# Patient Record
Sex: FEMALE | Race: BLACK OR AFRICAN AMERICAN | NOT HISPANIC OR LATINO | Employment: OTHER | URBAN - METROPOLITAN AREA
[De-identification: names, ages, dates, MRNs, and addresses within clinical notes are randomized per-mention and may not be internally consistent; named-entity substitution may affect disease eponyms.]

---

## 2021-09-16 PROCEDURE — 76815 OB US LIMITED FETUS(S): CPT | Mod: 26 | Performed by: EMERGENCY MEDICINE

## 2021-09-16 PROCEDURE — 76815 OB US LIMITED FETUS(S): CPT

## 2021-09-16 PROCEDURE — 99284 EMERGENCY DEPT VISIT MOD MDM: CPT | Mod: 25 | Performed by: EMERGENCY MEDICINE

## 2021-09-16 PROCEDURE — 99284 EMERGENCY DEPT VISIT MOD MDM: CPT | Mod: 25

## 2021-09-16 ASSESSMENT — MIFFLIN-ST. JEOR: SCORE: 1555.1

## 2021-09-17 ENCOUNTER — HOSPITAL ENCOUNTER (EMERGENCY)
Facility: CLINIC | Age: 26
Discharge: HOME | End: 2021-09-17
Attending: EMERGENCY MEDICINE | Admitting: EMERGENCY MEDICINE
Payer: MEDICAID

## 2021-09-17 VITALS
HEIGHT: 65 IN | BODY MASS INDEX: 29.72 KG/M2 | WEIGHT: 178.4 LBS | SYSTOLIC BLOOD PRESSURE: 118 MMHG | HEART RATE: 68 BPM | TEMPERATURE: 98.2 F | DIASTOLIC BLOOD PRESSURE: 81 MMHG | RESPIRATION RATE: 16 BRPM | OXYGEN SATURATION: 99 %

## 2021-09-17 DIAGNOSIS — O26.892 PREGNANCY RELATED PELVIC PAIN, ANTEPARTUM, SECOND TRIMESTER: ICD-10-CM

## 2021-09-17 DIAGNOSIS — Z3A.17 17 WEEKS GESTATION OF PREGNANCY: ICD-10-CM

## 2021-09-17 DIAGNOSIS — N94.9 ROUND LIGAMENT PAIN: ICD-10-CM

## 2021-09-17 DIAGNOSIS — R10.2 PREGNANCY RELATED PELVIC PAIN, ANTEPARTUM, SECOND TRIMESTER: ICD-10-CM

## 2021-09-17 LAB
ALBUMIN SERPL-MCNC: 3.6 G/DL (ref 3.4–5)
ALBUMIN UR-MCNC: NEGATIVE MG/DL
ALP SERPL-CCNC: 60 U/L (ref 40–150)
ALT SERPL W P-5'-P-CCNC: 19 U/L (ref 0–50)
ANION GAP SERPL CALCULATED.3IONS-SCNC: 9 MMOL/L (ref 3–14)
APPEARANCE UR: CLEAR
AST SERPL W P-5'-P-CCNC: 15 U/L (ref 0–45)
BASOPHILS # BLD AUTO: 0 10E3/UL (ref 0–0.2)
BASOPHILS NFR BLD AUTO: 0 %
BILIRUB SERPL-MCNC: 0.7 MG/DL (ref 0.2–1.3)
BILIRUB UR QL STRIP: NEGATIVE
BUN SERPL-MCNC: 5 MG/DL (ref 7–30)
CALCIUM SERPL-MCNC: 9.2 MG/DL (ref 8.5–10.1)
CHLORIDE BLD-SCNC: 102 MMOL/L (ref 94–109)
CO2 SERPL-SCNC: 24 MMOL/L (ref 20–32)
COLOR UR AUTO: ABNORMAL
CREAT SERPL-MCNC: 0.71 MG/DL (ref 0.52–1.04)
EOSINOPHIL # BLD AUTO: 0.1 10E3/UL (ref 0–0.7)
EOSINOPHIL NFR BLD AUTO: 1 %
ERYTHROCYTE [DISTWIDTH] IN BLOOD BY AUTOMATED COUNT: 13.2 % (ref 10–15)
GFR SERPL CREATININE-BSD FRML MDRD: >90 ML/MIN/1.73M2
GLUCOSE BLD-MCNC: 93 MG/DL (ref 70–99)
GLUCOSE UR STRIP-MCNC: NEGATIVE MG/DL
HCG UR QL: POSITIVE
HCT VFR BLD AUTO: 39.2 % (ref 35–47)
HGB BLD-MCNC: 13.8 G/DL (ref 11.7–15.7)
HGB UR QL STRIP: NEGATIVE
HYALINE CASTS: 1 /LPF
IMM GRANULOCYTES # BLD: 0 10E3/UL
IMM GRANULOCYTES NFR BLD: 0 %
KETONES UR STRIP-MCNC: ABNORMAL MG/DL
LEUKOCYTE ESTERASE UR QL STRIP: NEGATIVE
LIPASE SERPL-CCNC: 175 U/L (ref 73–393)
LYMPHOCYTES # BLD AUTO: 2 10E3/UL (ref 0.8–5.3)
LYMPHOCYTES NFR BLD AUTO: 31 %
MCH RBC QN AUTO: 28.3 PG (ref 26.5–33)
MCHC RBC AUTO-ENTMCNC: 35.2 G/DL (ref 31.5–36.5)
MCV RBC AUTO: 81 FL (ref 78–100)
MONOCYTES # BLD AUTO: 0.4 10E3/UL (ref 0–1.3)
MONOCYTES NFR BLD AUTO: 6 %
MUCOUS THREADS #/AREA URNS LPF: PRESENT /LPF
NEUTROPHILS # BLD AUTO: 3.8 10E3/UL (ref 1.6–8.3)
NEUTROPHILS NFR BLD AUTO: 62 %
NITRATE UR QL: NEGATIVE
NRBC # BLD AUTO: 0 10E3/UL
NRBC BLD AUTO-RTO: 0 /100
PH UR STRIP: 5.5 [PH] (ref 5–7)
PLATELET # BLD AUTO: 191 10E3/UL (ref 150–450)
POTASSIUM BLD-SCNC: 3.5 MMOL/L (ref 3.4–5.3)
PROT SERPL-MCNC: 8.4 G/DL (ref 6.8–8.8)
RBC # BLD AUTO: 4.87 10E6/UL (ref 3.8–5.2)
RBC URINE: 0 /HPF
SODIUM SERPL-SCNC: 135 MMOL/L (ref 133–144)
SP GR UR STRIP: 1.01 (ref 1–1.03)
SQUAMOUS EPITHELIAL: <1 /HPF
UROBILINOGEN UR STRIP-MCNC: NORMAL MG/DL
WBC # BLD AUTO: 6.2 10E3/UL (ref 4–11)
WBC URINE: 1 /HPF

## 2021-09-17 PROCEDURE — 81001 URINALYSIS AUTO W/SCOPE: CPT | Performed by: EMERGENCY MEDICINE

## 2021-09-17 PROCEDURE — 36415 COLL VENOUS BLD VENIPUNCTURE: CPT | Performed by: EMERGENCY MEDICINE

## 2021-09-17 PROCEDURE — 83690 ASSAY OF LIPASE: CPT | Performed by: EMERGENCY MEDICINE

## 2021-09-17 PROCEDURE — 80053 COMPREHEN METABOLIC PANEL: CPT | Performed by: EMERGENCY MEDICINE

## 2021-09-17 PROCEDURE — 250N000013 HC RX MED GY IP 250 OP 250 PS 637: Performed by: EMERGENCY MEDICINE

## 2021-09-17 PROCEDURE — 81025 URINE PREGNANCY TEST: CPT | Performed by: EMERGENCY MEDICINE

## 2021-09-17 PROCEDURE — 85025 COMPLETE CBC W/AUTO DIFF WBC: CPT | Performed by: EMERGENCY MEDICINE

## 2021-09-17 PROCEDURE — 250N000011 HC RX IP 250 OP 636: Performed by: EMERGENCY MEDICINE

## 2021-09-17 RX ORDER — ONDANSETRON 4 MG/1
4 TABLET, ORALLY DISINTEGRATING ORAL ONCE
Status: COMPLETED | OUTPATIENT
Start: 2021-09-17 | End: 2021-09-17

## 2021-09-17 RX ORDER — ACETAMINOPHEN 500 MG
1000 TABLET ORAL ONCE
Status: COMPLETED | OUTPATIENT
Start: 2021-09-17 | End: 2021-09-17

## 2021-09-17 RX ORDER — ACETAMINOPHEN 325 MG/1
325-650 TABLET ORAL EVERY 6 HOURS PRN
Qty: 30 TABLET | Refills: 0 | Status: SHIPPED | OUTPATIENT
Start: 2021-09-17 | End: 2022-01-25

## 2021-09-17 RX ADMIN — ONDANSETRON 4 MG: 4 TABLET, ORALLY DISINTEGRATING ORAL at 01:34

## 2021-09-17 RX ADMIN — ACETAMINOPHEN 1000 MG: 500 TABLET, FILM COATED ORAL at 01:34

## 2021-09-17 NOTE — ED PROVIDER NOTES
"    [facility] EMERGENCY DEPARTMENT ([facility])   September 17, 2021  History     Chief Complaint   Patient presents with     Abdominal Pain     17 weeks 5 days pregnant.       HPI  Esperanza Wilder is a 26 year old female who presents to the Emergency Department for evaluation of lower abdominal pain starting 5 days ago.  Patient reports she is 17 weeks and 6 days pregnant.  Patient reports this is her 1st pregnancy and she has not had issues so far.  She reports she has bilateral lower abdominal pain that radiates into the pelvis.  She states she has had some vomiting.  She denies vaginal bleeding, dysuria, hematuria, fever, chest pain, shortness of breath, or abnormal bowel movements.  She denies allergies or past surgeries.  She reports she has not been seen for this pregnancy.  Denies any other medical issues.    PAST MEDICAL HISTORY: No past medical history on file.    PAST SURGICAL HISTORY: No past surgical history on file.    Past medical history, past surgical history, medications, and allergies were reviewed with the patient. Additional pertinent items: None    FAMILY HISTORY: No family history on file.    SOCIAL HISTORY:   Social History     Tobacco Use     Smoking status: Not on file   Substance Use Topics     Alcohol use: Not on file     Social history was reviewed with the patient. Additional pertinent items: None      Patient's Medications    No medications on file        No Known Allergies     Review of Systems  A complete review of systems was performed with pertinent positives and negatives noted in the HPI, and all other systems negative.    Physical Exam   BP: 113/65  Pulse: 81  Temp: 97.8  F (36.6  C)  Resp: 18  Height: 165.1 cm (5' 5\")  Weight: 80.9 kg (178 lb 6.4 oz)  SpO2: 100 %  Lying Orthostatic BP: 113/65      Physical Exam  Physical Exam   Constitutional: oriented to person, place, and time. appears well-developed and well-nourished.   HENT:   Head: Normocephalic and atraumatic. "   Neck: Normal range of motion.   Pulmonary/Chest: Effort normal. No respiratory distress.   Cardiac: No murmurs, rubs, gallops. RRR.  Abdominal: Abdomen soft, nontender, nondistended. No rebound tenderness. No CVA tenderness.  MSK: Long bones without deformity or evidence of trauma  Neurological: alert and oriented to person, place, and time.   Skin: Skin is warm and dry.   Psychiatric:  normal mood and affect.  behavior is normal. Thought content normal.     ED Course   1:04 AM  The patient was seen and examined by Jakob Jackson MD in Room ED08.      Procedures     Results for orders placed or performed during the hospital encounter of 09/17/21   POC US OB TRANSABDOMINAL LIMITED     Status: None    Impression    Limited Bedside Transabdominal ultrasound for evaluation of IUP        Performed any interpreted by me.    Indication:  abdominal pain  Findings:  The lower abdomen was interrogated with a curvilinear probe. The uterus was identified.   Within the uterus there is a moving fetus with visible heart rate with FHR of 140's    Impression: Intrauterine pregnancy     UA with Microscopic reflex to Culture     Status: Abnormal    Specimen: Urine, Midstream   Result Value Ref Range    Color Urine Light Yellow Colorless, Straw, Light Yellow, Yellow    Appearance Urine Clear Clear    Glucose Urine Negative Negative mg/dL    Bilirubin Urine Negative Negative    Ketones Urine Trace (A) Negative mg/dL    Specific Gravity Urine 1.010 1.003 - 1.035    Blood Urine Negative Negative    pH Urine 5.5 5.0 - 7.0    Protein Albumin Urine Negative Negative mg/dL    Urobilinogen Urine Normal Normal, 2.0 mg/dL    Nitrite Urine Negative Negative    Leukocyte Esterase Urine Negative Negative    Mucus Urine Present (A) None Seen /LPF    RBC Urine 0 <=2 /HPF    WBC Urine 1 <=5 /HPF    Squamous Epithelials Urine <1 <=1 /HPF    Hyaline Casts Urine 1 <=2 /LPF    Narrative    Urine Culture not indicated   HCG qualitative urine (UPT)      Status: Abnormal   Result Value Ref Range    hCG Urine Qualitative Positive (A) Negative   Comprehensive metabolic panel     Status: Abnormal   Result Value Ref Range    Sodium 135 133 - 144 mmol/L    Potassium 3.5 3.4 - 5.3 mmol/L    Chloride 102 94 - 109 mmol/L    Carbon Dioxide (CO2) 24 20 - 32 mmol/L    Anion Gap 9 3 - 14 mmol/L    Urea Nitrogen 5 (L) 7 - 30 mg/dL    Creatinine 0.71 0.52 - 1.04 mg/dL    Calcium 9.2 8.5 - 10.1 mg/dL    Glucose 93 70 - 99 mg/dL    Alkaline Phosphatase 60 40 - 150 U/L    AST 15 0 - 45 U/L    ALT 19 0 - 50 U/L    Protein Total 8.4 6.8 - 8.8 g/dL    Albumin 3.6 3.4 - 5.0 g/dL    Bilirubin Total 0.7 0.2 - 1.3 mg/dL    GFR Estimate >90 >60 mL/min/1.73m2   Lipase     Status: Normal   Result Value Ref Range    Lipase 175 73 - 393 U/L   CBC with platelets and differential     Status: None   Result Value Ref Range    WBC Count 6.2 4.0 - 11.0 10e3/uL    RBC Count 4.87 3.80 - 5.20 10e6/uL    Hemoglobin 13.8 11.7 - 15.7 g/dL    Hematocrit 39.2 35.0 - 47.0 %    MCV 81 78 - 100 fL    MCH 28.3 26.5 - 33.0 pg    MCHC 35.2 31.5 - 36.5 g/dL    RDW 13.2 10.0 - 15.0 %    Platelet Count 191 150 - 450 10e3/uL    % Neutrophils 62 %    % Lymphocytes 31 %    % Monocytes 6 %    % Eosinophils 1 %    % Basophils 0 %    % Immature Granulocytes 0 %    NRBCs per 100 WBC 0 <1 /100    Absolute Neutrophils 3.8 1.6 - 8.3 10e3/uL    Absolute Lymphocytes 2.0 0.8 - 5.3 10e3/uL    Absolute Monocytes 0.4 0.0 - 1.3 10e3/uL    Absolute Eosinophils 0.1 0.0 - 0.7 10e3/uL    Absolute Basophils 0.0 0.0 - 0.2 10e3/uL    Absolute Immature Granulocytes 0.0 <=0.0 10e3/uL    Absolute NRBCs 0.0 10e3/uL   CBC with platelets differential     Status: None    Narrative    The following orders were created for panel order CBC with platelets differential.  Procedure                               Abnormality         Status                     ---------                               -----------         ------                     CBC with  platelets and d...[[id number]]                      Final result                 Please view results for these tests on the individual orders.       No results found for this or any previous visit (from the past 24 hour(s)).  Medications - No data to display          Assessments & Plan (with Medical Decision Making)   MDM  Patient is presenting with abdominal pain. This is on both sides of her abdomen in the inguinal area. There are no masses hernia. She is having bowel movements, very unlikely obstruction. She has no focal right lower left lower quadrant tenderness, white blood count normal, very unlikely appendicitis or diverticulitis. Not feel CT is necessary at this point. No upper abdominal pain, very unlikely gallbladder related etiology. Labs are reassuring. Urinalysis does not reveal infection. She is not having bleeding, baby appears grossly healthy on ultrasound with a good heart rate and movement. This does seem to be round ligament pain in the setting of second trimester pregnancy and typical symptoms around ligament pain. She will be given a OB follow-up and Tylenol to go home with. She does feel better after Tylenol here.    I have reviewed the nursing notes.    I have reviewed the findings, diagnosis, plan and need for follow up with the patient.    New Prescriptions    ACETAMINOPHEN (TYLENOL) 325 MG TABLET    Take 1-2 tablets (325-650 mg) by mouth every 6 hours as needed for mild pain       Final diagnoses:   Round ligament pain     IRasheed, am serving as a trained medical scribe to document services personally performed by Jakob Jackson MD, based on the provider's statements to me.     IJakob MD, was physically present and have reviewed and verified the accuracy of this note documented by Rasheed Henderson.    --  Jakob Jackson MD  9/16/2021   [facility] EMERGENCY DEPARTMENT     Al Jackson MD  09/17/21 0245

## 2021-09-17 NOTE — DISCHARGE INSTRUCTIONS
Please make an appointment to follow up with OB/Gyn - [facility] (phone: [phone]) as soon as possible.    Return to the emergency department if you develop worsening symptoms, bleeding, fevers or if you have any further concerns    If Esperanza has discomfort from fever or other pain, she can have:  Acetaminophen (Tylenol) every 6 hours as needed. Her dose is:    2 regular strength tabs (650 mg)                                     (43.2+ kg/96+ lb)    NOTE: If your acetaminophen (Tylenol) came with a dropper marked with 0.4 and 0.8 ml, call us ([phone]) or check with your doctor about the dose before using it.

## 2021-09-21 ENCOUNTER — TELEPHONE (OUTPATIENT)
Dept: FAMILY MEDICINE | Facility: CLINIC | Age: 26
End: 2021-09-21

## 2021-09-21 DIAGNOSIS — Z32.01 PREGNANCY TEST POSITIVE: Primary | ICD-10-CM

## 2021-09-21 NOTE — TELEPHONE ENCOUNTER
"Please call patient's  to establish OB care for patient. Reports referred to [facility] after ER visit and patient found out she is \"almost 18 weeks pregnant\".  Montez Healy [phone]  "

## 2021-09-21 NOTE — TELEPHONE ENCOUNTER
RN called pt's  and scheduled. Soonest wasn't until 9/30.offered to refer to other clinic to get in sooner but wanted to come here    Ruthie Tesfaye RN

## 2021-09-30 ENCOUNTER — OFFICE VISIT (OUTPATIENT)
Dept: FAMILY MEDICINE | Facility: CLINIC | Age: 26
End: 2021-09-30
Payer: MEDICAID

## 2021-09-30 VITALS
HEIGHT: 66 IN | OXYGEN SATURATION: 98 % | WEIGHT: 180 LBS | SYSTOLIC BLOOD PRESSURE: 97 MMHG | BODY MASS INDEX: 28.93 KG/M2 | TEMPERATURE: 97.5 F | HEART RATE: 74 BPM | DIASTOLIC BLOOD PRESSURE: 64 MMHG

## 2021-09-30 DIAGNOSIS — Z23 HIGH PRIORITY FOR 2019-NCOV VACCINE: ICD-10-CM

## 2021-09-30 DIAGNOSIS — Z3A.19 19 WEEKS GESTATION OF PREGNANCY: Primary | ICD-10-CM

## 2021-09-30 DIAGNOSIS — Z23 NEED FOR PROPHYLACTIC VACCINATION AND INOCULATION AGAINST INFLUENZA: ICD-10-CM

## 2021-09-30 DIAGNOSIS — Z32.01 PREGNANCY TEST POSITIVE: ICD-10-CM

## 2021-09-30 PROCEDURE — 90471 IMMUNIZATION ADMIN: CPT | Performed by: FAMILY MEDICINE

## 2021-09-30 PROCEDURE — 99207 PR FIRST OB VISIT: CPT | Mod: 25 | Performed by: FAMILY MEDICINE

## 2021-09-30 PROCEDURE — 0001A COVID-19,PF,PFIZER (12+ YRS): CPT | Performed by: FAMILY MEDICINE

## 2021-09-30 PROCEDURE — 99207 PR NO BILLABLE SERVICE THIS VISIT: CPT

## 2021-09-30 PROCEDURE — 90686 IIV4 VACC NO PRSV 0.5 ML IM: CPT | Performed by: FAMILY MEDICINE

## 2021-09-30 PROCEDURE — 91300 COVID-19,PF,PFIZER (12+ YRS): CPT | Performed by: FAMILY MEDICINE

## 2021-09-30 RX ORDER — PNV NO.95/FERROUS FUM/FOLIC AC 28MG-0.8MG
1 TABLET ORAL DAILY
Qty: 90 TABLET | Refills: 1 | Status: SHIPPED | OUTPATIENT
Start: 2021-09-30

## 2021-09-30 ASSESSMENT — MIFFLIN-ST. JEOR: SCORE: 1574.22

## 2021-09-30 NOTE — PROGRESS NOTES
Past Medical History     Do you have a history of any of the following medical conditions?    Condition No/Yes/Details   Hypertension No    Heart disease, mitral valve prolapse, rheumatic fever No    Asthma or another chronic lung disease No    An autoimmune disorder No    Kidney disease No    Frequent urinary tract infections No    Epilepsy, seizures, or spells No    Migraine headaches No    Stroke, loss of sensation/function, seizures, or other neuro problem No    Diabetes No    Thyroid problems or have you taken thyroid medication No    Hepatitis, liver disease, jaundice No    Blood clots, phlebitis, pulmonary embolism or varicose veins No    Excessive bleeding after surgery or dental work No    Do you have more bleeding than other women after a cut or a scratch? No    Anemia No    Blood transfusions No         Would you refuse a blood transfusion?       No   If yes, then ask next question. If no, skip next question.   Would you rather die than receive a blood transfusion?    Breast problems No    Have you ever ? No    Abnormalities of the uterus No    Abnormal pap smear No    Have you ever been treated for depression? No    Are you having problems with crying spells or loss of self-esteem? No    Have you ever required psychiatric care? No    Have you been physically, sexually, or emotionally hurt by someone? No    Have you been in a major accident or suffered serious trauma? No    Have you ever had any gynecological surgical procedures such as cervical conization, LEEP, laser treatment, cryosurgery of the cervix, or a dilatation and curettage? No    Have you had any other surgical procedures? No         Have you ever had any complications from anesthesia? No    Have you ever been hospitalized for a nonsurgical reason? No                Genetic Screening          Is the patient 35 years or older? No      Do you have a history of any of the following No/Yes/Details        A metabolic  disorder (e.g. Insulin-dependent DM, PKU) No         Recurrent pregnancy loss or still birth No      Do you, the baby's father, or anyone in your families have          Thalassemia AND MCV <80 No         Hemophilia No         Neural tube defect No }        Congenital heart defect No         Sickle cell disease or trait No         Muscular dystrophy No         Cystic fibrosis No         Mental retardation or autism No         Down's syndrome No         Hector-Sach's disease No         Haysi's chorea No         Any other inherited genetic or chromosomal disorder No         A child with birth defects not listed above No                Infection History     Ever treated for tuberculosis or had a positive skin test No    Ever had genital herpes (or has your partner) No    Had a rash or viral illness since LMP No    Ever had a sexually transmitted infection No    Ever had chicken pox or the vaccine No    Have you had a sexual partner who is HIV positive No    Ever had any other serious infectious disease No               Substance use and exposure     Does anyone in your home smoke? No    Do you use tobacco products or betel nut? No    Do you drink beer, wine, or hard liquor? No    Do you use any of the following: marijuana, speed, cocaine, heroin, hallucinogens, or other drugs? No               Symptoms since Last Menstrual Period     Do you currently have any of the following symptoms: No/Yes/Details        Abdominal pain No         Blood in the stools or urine No         Chest pain No         Shortness of breath No         coughing or vomiting up blood No         Your heart racing or skipping beats No         Nausea or vomiting  YES occasional         Pain on urination No         Vaginal discharge or bleeding No                Risk Assessment     Average Risk Category  No significant risk factors: No    At Risk Category (up to 3)  Teen pregnancy: No  Poor social situation: No  Domestic abuse: No  Financial  difficulties: No  Smoker: No  H/O  deliver: No  H/O drug abuse: No  Non-English speaking: Yes  Advanced maternal age: No  H/O PIH: No  GDM risks: No  Previous C/S: No  H/O STIs: No  H/O mental health concerns: No  Onset care > 20 weeks: No  Other:     High Risk Category (4 or more At Risk or)  Diabetes/GDM: No  Multiple gestation: No  Chronic hypertension: No  Significant hx of asthma: No  Fetal demise > 20 weeks: No  Positive tox screen: No  Current mental health treatment: No  Other:     Risk: Average Risk   Date determined: 21                  Does Patient meet criteria for early diabetes screening or aspirin prophylaxis?  No

## 2021-09-30 NOTE — PROGRESS NOTES
"Return OB visit  12-23 weeks    Subjective:   Esperanza is a 26 year old  female at 19w5d who presents for prenatal care. GAIL 2022.  - Concerns today: none  - Patient reports no vaginal bleeding, no contractions/severe cramping, no leakage of fluid. Fetal movement is present.   - Occasional nausea/no vomiting. No heartburn.   - No vaginal discharge. No dysuria.   - No headache, vision changes, lower extremity swelling, upper abdominal pain, chest pain, shortness of breath.   - Mood has been okay.    Objective:   BP 97/64 (BP Location: Left arm, Patient Position: Sitting, Cuff Size: Adult Regular)   Pulse 74   Temp 97.5  F (36.4  C) (Oral)   Ht 1.67 m (5' 5.75\")   Wt 81.6 kg (180 lb)   LMP 05/15/2021   SpO2 98%   BMI 29.28 kg/m     Const: No distress  Abd: Gravid.   See flowsheet for FH, FHTs, edema     Prenatal labs:   -   Hemoglobin   Date Value Ref Range Status   2021 13.8 11.7 - 15.7 g/dL Final       Assessment & plan:   IUP at 84srw3i weeks by LMP, has not had first trimester US and late to prenatal care.     - Prenatal labs ordered today  - Pregnancy uncomplicated so far.  - Discussed and ordered ultrasound for fetal survey.   - Discussed screening for gestational diabetes at 24-28 weeks.  - Provided counseling regarding  labor symptoms, depression and social support systems, breast feeding, contraception options after delivery. The patient plans to deliver at [facility] with myself and/or OB partner Dr Garcia.     Breastfeeding education provided:  - Cue Feeding  - Supply and Demand  - Exclusivity   - Good Latch  - Avoiding Artifical Nipples    - Patient will continue taking prenatal vitamins and avoiding cigarettes & alcohol.   - Return to clinic in 4 weeks.    - Specific concerns addressed today: entry to prenatal care, initial labs and fetal survey ordered. Pt sure of LMP and was not on contraception.    1. 19 weeks gestation of pregnancy  - URINE MACROSCOPIC WITH " REFLEX TO MICRO; Future  - Urine Culture Aerobic Bacterial; Future  - Treponema Abs w Reflex to RPR and Titer; Future  - HIV Antigen Antibody Combo; Future  - HEPATITIS B SURFACE ANTIGEN; Future  - CBC WITH PLATELETS; Future  - Rubella Antibody IgG; Future  - ABO/Rh type and screen; Future  - Neisseria gonorrhoeae PCR; Future  - Chlamydia trachomatis PCR; Future  - Hemoglobin A1c; Future    2. Pregnancy test positive    Options for treatment and follow-up care were reviewed with the patient and/or guardian. Esperanza Wilder and/or guardian engaged in the decision making process and verbalized understanding of the options discussed and agreed with the final plan.    Arianne Morales, DO

## 2021-09-30 NOTE — PATIENT INSTRUCTIONS
Thank you for coming to [facility]!  - If you had lab testing today and your results are normal they will be be mailed to you or sent to your MyChart within seven days.   - If the lab tests need quick action we will call you with the results.  - The phone number we will call with results is # [phone] (home) . If this is not the best number please call our clinic and change the number.  - If any referrals were made to [facility] and you don't get a call, call central scheduling [phone]  - If you need any refills please call your pharmacy and they will contact us.  - If you had an XRay/CT/Ultrasound/MRI ordered the number is [phone] to schedule or change your appointment  - If you have any concerns about today's visit or wish to schedule another appointment please call our office [phone] (8-5:00 M-F)  - If you have urgent medical concerns call [phone] at any time of the day.  - If you have a medical emergency please call [phone].    Because you are pregnant, we have additional resources for you:  - You may call and ask to speak with one of our clinic nurses at [phone] during normal business hours, for non-urgent questions about your pregnancy.  - Immediate OB help is also available 24 hours a day, seven days a week via the [facility] ([facility]) - [phone]  located at [address] [place] [address]    Prenatal Timeline:  Before 14 weeks: dating ultrasound       This ultrasound helps us determine your dates accurately.  15 - 18 weeks: Optional genetic testing (quad screen)       This testing helps understand your baby's risk for some genetic abnormalities.  22 - 24 weeks: Ultrasound (fetal anatomy survey)       This testing will look for early growth abnormalities, and might tell the baby's sex.  24 - 28 weeks: One hour sugar test (GCT)        This test helps identify diabetes of pregnancy.  27 - 36 weeks: Tetanus  shot (Tdap)       This shot helps protect you and your baby from tetanus and whooping cough.  36 weeks and later: Group B Strep test (GBS)       This test helps predict if you need antibiotics in labor to prevent infection in your baby.  Anytime September to April: flu shot       This shot helps protect you and your family from the flu.  This is especially important during pregnancy!  Once every trimester: blood iron test (hemoglobin)       This test helps us know if you will need extra iron to support your baby.  At any time during or after your pregnancy: Some women experience baby blues.  We can help you with: Feeling anxious, Overwhelmed or sad, Trouble sleeping, Crying uncontrollably, Trouble caring for yourself or baby.    How frequently should you see your provider?  < 28 weeks: every 4 weeks  28-36 weeks: every 2 weeks  >36 weeks: every week    Call your healthcare provider immediately if you experience any of the following symptoms:  Bleeding from nipples, rectum, bladder, or coughing up blood  Vaginal bleeding, no matter how slight (unless small amount after a pelvic exam)  Swelling of hands or face  Dimness or blurring of vision  Severe or continuous headaches  Abdominal pains or contractions that do not go away with heat and rest or a bowel movement  Chills or fever over 100.4  Persistent vomiting  Painful or burning urination  Decrease in fetal movement  Sudden or slow escape of fluid from the vagina

## 2021-10-01 NOTE — PROGRESS NOTES
Family Medicine OB Education    I provided the following OB education to Esperanza Wilder.    Discussed that Dr Morales & Dr. Garcia should be the doctor that she sees for her prenatal visits and that they will try hard to be the one to deliver her baby.  Discussed that if they were unavailable that one of our other physicians would deliver the baby and that this could be a male or female provider.  Briefly discussed residency program and that multiple doctors would be present at time of delivery.  Sheet given and discussed fetal growth and development.  Sheet given and discussed warning signs with reasons to call clinic, L&D. Sheet given and discussed Tdap to be given after 27 weeks gestation and the importance of family members get immunized before delivery.See questionnaire and pregnancy risk assessment for further information in provider encounter.     Name of provider who requested the OB education: Andrew  Name of provider on site (faculty or community preceptor) at the time of performing the OB education: Andrew Tesfaye, RN, RN

## 2021-10-06 ENCOUNTER — ANCILLARY PROCEDURE (OUTPATIENT)
Dept: ULTRASOUND IMAGING | Facility: CLINIC | Age: 26
End: 2021-10-06
Attending: FAMILY MEDICINE
Payer: MEDICAID

## 2021-10-06 DIAGNOSIS — Z32.01 PREGNANCY TEST POSITIVE: ICD-10-CM

## 2021-10-06 PROCEDURE — 76805 OB US >/= 14 WKS SNGL FETUS: CPT | Performed by: RADIOLOGY

## 2021-10-18 ENCOUNTER — OFFICE VISIT (OUTPATIENT)
Dept: FAMILY MEDICINE | Facility: CLINIC | Age: 26
End: 2021-10-18
Payer: MEDICAID

## 2021-10-18 VITALS
HEART RATE: 74 BPM | RESPIRATION RATE: 16 BRPM | SYSTOLIC BLOOD PRESSURE: 96 MMHG | DIASTOLIC BLOOD PRESSURE: 61 MMHG | TEMPERATURE: 98.4 F | OXYGEN SATURATION: 100 % | BODY MASS INDEX: 29.15 KG/M2 | WEIGHT: 179.2 LBS

## 2021-10-18 DIAGNOSIS — G44.209 TENSION HEADACHE: Primary | ICD-10-CM

## 2021-10-18 DIAGNOSIS — Z3A.19 19 WEEKS GESTATION OF PREGNANCY: ICD-10-CM

## 2021-10-18 LAB
ABO/RH(D): NORMAL
ALBUMIN UR-MCNC: NEGATIVE MG/DL
ANTIBODY SCREEN: NEGATIVE
APPEARANCE UR: CLEAR
BILIRUB UR QL STRIP: NEGATIVE
COLOR UR AUTO: YELLOW
ERYTHROCYTE [DISTWIDTH] IN BLOOD BY AUTOMATED COUNT: 13 % (ref 10–15)
GLUCOSE UR STRIP-MCNC: NEGATIVE MG/DL
HBA1C MFR BLD: 4.8 % (ref 0–5.6)
HBV SURFACE AG SERPL QL IA: NONREACTIVE
HCT VFR BLD AUTO: 33.1 %
HGB BLD-MCNC: 11.5 G/DL (ref 11.7–15.7)
HGB UR QL STRIP: NEGATIVE
HIV 1+2 AB+HIV1 P24 AG SERPL QL IA: NONREACTIVE
KETONES UR STRIP-MCNC: ABNORMAL MG/DL
LEUKOCYTE ESTERASE UR QL STRIP: NEGATIVE
MCH RBC QN AUTO: 28.3 PG (ref 26.5–33)
MCHC RBC AUTO-ENTMCNC: 34.7 G/DL (ref 31.5–36.5)
MCV RBC AUTO: 82 FL (ref 78–100)
NITRATE UR QL: NEGATIVE
PH UR STRIP: 5.5 [PH] (ref 5–8)
PLATELET # BLD AUTO: 171 10E3/UL (ref 150–450)
RBC # BLD AUTO: 4.06 10E6/UL (ref 3.8–5.2)
SP GR UR STRIP: >=1.03 (ref 1–1.03)
SPECIMEN EXPIRATION DATE: NORMAL
T PALLIDUM AB SER QL: NONREACTIVE
UROBILINOGEN UR STRIP-ACNC: 0.2 E.U./DL
WBC # BLD AUTO: 7.2 10E3/UL (ref 4–11)

## 2021-10-18 PROCEDURE — 86900 BLOOD TYPING SEROLOGIC ABO: CPT | Performed by: FAMILY MEDICINE

## 2021-10-18 PROCEDURE — 87086 URINE CULTURE/COLONY COUNT: CPT | Performed by: FAMILY MEDICINE

## 2021-10-18 PROCEDURE — 85027 COMPLETE CBC AUTOMATED: CPT | Performed by: FAMILY MEDICINE

## 2021-10-18 PROCEDURE — 86850 RBC ANTIBODY SCREEN: CPT | Performed by: FAMILY MEDICINE

## 2021-10-18 PROCEDURE — 87340 HEPATITIS B SURFACE AG IA: CPT | Performed by: FAMILY MEDICINE

## 2021-10-18 PROCEDURE — 83036 HEMOGLOBIN GLYCOSYLATED A1C: CPT | Performed by: FAMILY MEDICINE

## 2021-10-18 PROCEDURE — 87491 CHLMYD TRACH DNA AMP PROBE: CPT | Performed by: FAMILY MEDICINE

## 2021-10-18 PROCEDURE — 86901 BLOOD TYPING SEROLOGIC RH(D): CPT | Performed by: FAMILY MEDICINE

## 2021-10-18 PROCEDURE — 87591 N.GONORRHOEAE DNA AMP PROB: CPT | Performed by: FAMILY MEDICINE

## 2021-10-18 PROCEDURE — 86780 TREPONEMA PALLIDUM: CPT | Performed by: FAMILY MEDICINE

## 2021-10-18 PROCEDURE — 86762 RUBELLA ANTIBODY: CPT | Performed by: FAMILY MEDICINE

## 2021-10-18 PROCEDURE — 81003 URINALYSIS AUTO W/O SCOPE: CPT | Performed by: FAMILY MEDICINE

## 2021-10-18 PROCEDURE — 99207 PR PRENATAL VISIT: CPT | Performed by: FAMILY MEDICINE

## 2021-10-18 PROCEDURE — 36415 COLL VENOUS BLD VENIPUNCTURE: CPT | Performed by: FAMILY MEDICINE

## 2021-10-18 PROCEDURE — 87389 HIV-1 AG W/HIV-1&-2 AB AG IA: CPT | Performed by: FAMILY MEDICINE

## 2021-10-18 RX ORDER — ACETAMINOPHEN 500 MG
500-1000 TABLET ORAL EVERY 6 HOURS PRN
Qty: 100 TABLET | Refills: 0 | Status: SHIPPED | OUTPATIENT
Start: 2021-10-18 | End: 2022-01-25

## 2021-10-18 NOTE — PROGRESS NOTES
OB Visit 12-23 weeks  Subjective    26 year old woman presents at 22w2d.   Concerns: none    Headache YES- tention type, no visual change/edema/elevated BP - has not tried tylenol.   Changes in vision no  Chest pain  no  Dyspnea no  Nausea no  no  vomiting. Abdominal pain no . Dysuria no  Vaginal Discharge no  Vaginal bleeding no .  Loss of Fluid no    Extremity swelling no .   Fetal movement Yes.    There is no problem list on file for this patient.      Current Outpatient Medications   Medication Sig Dispense Refill     Prenatal Vit-Fe Fumarate-FA (PRENATAL VITAMIN) 27-0.8 MG TABS Take 1 tablet by mouth daily 90 tablet 1     acetaminophen (TYLENOL) 325 MG tablet Take 1-2 tablets (325-650 mg) by mouth every 6 hours as needed for mild pain (Patient not taking: Reported on 2021) 30 tablet 0       Guidance:  OTC medications  weight gain and exercise  quickening  child birth education  fetal survey ultrasound    Objective  BP 96/61   Pulse 74   Temp 98.4  F (36.9  C) (Oral)   Resp 16   Wt 81.3 kg (179 lb 3.2 oz)   LMP 05/15/2021   SpO2 100%   BMI 29.15 kg/m    No distress.  Gravid abdomen.  .  Fundal height 21 cm.  No edema.    Results  Blood type: pending  No results found for any visits on 10/18/21.    Assessment & Plan  26 year old  at 22w2d with GAIL 2022 based on LMP.    (G44.209) Tension headache  (primary encounter diagnosis)  Comment: no red flag signs for pre-E, try tyelnol, massage, heating pad  Plan: acetaminophen (TYLENOL) 500 MG tablet    (Z3A.19) 19 weeks gestation of pregnancy  Comment: has not had prenatal labs done, late to care  Plan: prenatal labs today, schedule fetal survey    Return to clinic in 4 weeks.    JACQUELINE LAY

## 2021-10-19 LAB
BACTERIA UR CULT: NO GROWTH
C TRACH DNA SPEC QL NAA+PROBE: NEGATIVE
N GONORRHOEA DNA SPEC QL NAA+PROBE: NEGATIVE
RUBV IGG SERPL QL IA: 11.5 INDEX
RUBV IGG SERPL QL IA: POSITIVE

## 2021-10-21 ENCOUNTER — IMMUNIZATION (OUTPATIENT)
Dept: FAMILY MEDICINE | Facility: CLINIC | Age: 26
End: 2021-10-21
Attending: FAMILY MEDICINE
Payer: MEDICAID

## 2021-10-21 DIAGNOSIS — Z23 HIGH PRIORITY FOR 2019-NCOV VACCINE: Primary | ICD-10-CM

## 2021-10-21 PROCEDURE — 91300 COVID-19,PF,PFIZER (12+ YRS): CPT

## 2021-10-21 PROCEDURE — 0002A COVID-19,PF,PFIZER (12+ YRS): CPT

## 2021-10-21 NOTE — PROGRESS NOTES
Pt given Pfizer #2 injection today. Pt tolerated well and instructed to wait 15 minutes.   Margot Squires CMA,   
Patient is 18 years or older

## 2021-11-19 ENCOUNTER — OFFICE VISIT (OUTPATIENT)
Dept: FAMILY MEDICINE | Facility: CLINIC | Age: 26
End: 2021-11-19
Payer: MEDICAID

## 2021-11-19 VITALS
TEMPERATURE: 98 F | HEART RATE: 81 BPM | WEIGHT: 183 LBS | RESPIRATION RATE: 16 BRPM | OXYGEN SATURATION: 97 % | BODY MASS INDEX: 29.76 KG/M2 | DIASTOLIC BLOOD PRESSURE: 70 MMHG | SYSTOLIC BLOOD PRESSURE: 106 MMHG

## 2021-11-19 DIAGNOSIS — Z3A.26 26 WEEKS GESTATION OF PREGNANCY: Primary | ICD-10-CM

## 2021-11-19 PROCEDURE — 99207 PR PRENATAL VISIT: CPT | Mod: GC | Performed by: STUDENT IN AN ORGANIZED HEALTH CARE EDUCATION/TRAINING PROGRAM

## 2021-11-19 NOTE — PATIENT INSTRUCTIONS
Thank you for coming to [facility]!  - If you had lab testing today and your results are normal they will be be mailed to you or sent to your MyChart within seven days.   - If the lab tests need quick action we will call you with the results.  - The phone number we will call with results is # [phone] (home) . If this is not the best number please call our clinic and change the number.  - If any referrals were made to [facility] and you don't get a call, call central scheduling [phone]  - If you need any refills please call your pharmacy and they will contact us.  - If you had an XRay/CT/Ultrasound/MRI ordered the number is [phone] to schedule or change your appointment  - If you have any concerns about today's visit or wish to schedule another appointment please call our office [phone] (8-5:00 M-F)  - If you have urgent medical concerns call [phone] at any time of the day.  - If you have a medical emergency please call [phone].    Because you are pregnant, we have additional resources for you:  - You may call and ask to speak with one of our clinic nurses at [phone] during normal business hours, for non-urgent questions about your pregnancy.  - Immediate OB help is also available 24 hours a day, seven days a week via the [facility] ([facility]) - [phone]  located at [address] [place] [address]    Prenatal Timeline:  Before 14 weeks: dating ultrasound       This ultrasound helps us determine your dates accurately.  15 - 18 weeks: Optional genetic testing (quad screen)       This testing helps understand your baby's risk for some genetic abnormalities.  22 - 24 weeks: Ultrasound (fetal anatomy survey)       This testing will look for early growth abnormalities, and might tell the baby's sex.  24 - 28 weeks: One hour sugar test (GCT)        This test helps identify diabetes of pregnancy.  27 - 36 weeks: Tetanus  shot (Tdap)       This shot helps protect you and your baby from tetanus and whooping cough.  36 weeks and later: Group B Strep test (GBS)       This test helps predict if you need antibiotics in labor to prevent infection in your baby.  Anytime September to April: flu shot       This shot helps protect you and your family from the flu.  This is especially important during pregnancy!  Once every trimester: blood iron test (hemoglobin)       This test helps us know if you will need extra iron to support your baby.  At any time during or after your pregnancy: Some women experience baby blues.  We can help you with: Feeling anxious, Overwhelmed or sad, Trouble sleeping, Crying uncontrollably, Trouble caring for yourself or baby.    How frequently should you see your provider?  < 28 weeks: every 4 weeks  28-36 weeks: every 2 weeks  >36 weeks: every week    Call your healthcare provider immediately if you experience any of the following symptoms:  Bleeding from nipples, rectum, bladder, or coughing up blood  Vaginal bleeding, no matter how slight (unless small amount after a pelvic exam)  Swelling of hands or face  Dimness or blurring of vision  Severe or continuous headaches  Abdominal pains or contractions that do not go away with heat and rest or a bowel movement  Chills or fever over 100.4  Persistent vomiting  Painful or burning urination  Decrease in fetal movement  Sudden or slow escape of fluid from the vagina

## 2021-12-01 NOTE — PROGRESS NOTES
Preceptor Attestation:   Patient seen, evaluated and discussed with the resident. I have verified the content of the note, which accurately reflects my assessment of the patient and the plan of care.   Supervising Physician:  Arianne Morales, DO

## 2021-12-12 ENCOUNTER — HEALTH MAINTENANCE LETTER (OUTPATIENT)
Age: 26
End: 2021-12-12

## 2022-01-06 ENCOUNTER — OFFICE VISIT (OUTPATIENT)
Dept: FAMILY MEDICINE | Facility: CLINIC | Age: 27
End: 2022-01-06
Payer: COMMERCIAL

## 2022-01-06 VITALS
OXYGEN SATURATION: 100 % | WEIGHT: 185.4 LBS | BODY MASS INDEX: 30.15 KG/M2 | SYSTOLIC BLOOD PRESSURE: 101 MMHG | HEART RATE: 96 BPM | RESPIRATION RATE: 16 BRPM | TEMPERATURE: 97.9 F | DIASTOLIC BLOOD PRESSURE: 70 MMHG

## 2022-01-06 DIAGNOSIS — Z3A.26 26 WEEKS GESTATION OF PREGNANCY: ICD-10-CM

## 2022-01-06 LAB
GLUCOSE 1H P 50 G GLC PO SERPL-MCNC: 126 MG/DL (ref 70–129)
HGB BLD-MCNC: 11.6 G/DL (ref 11.7–15.7)

## 2022-01-06 PROCEDURE — 90471 IMMUNIZATION ADMIN: CPT | Performed by: FAMILY MEDICINE

## 2022-01-06 PROCEDURE — 36415 COLL VENOUS BLD VENIPUNCTURE: CPT | Performed by: FAMILY MEDICINE

## 2022-01-06 PROCEDURE — 85018 HEMOGLOBIN: CPT | Performed by: FAMILY MEDICINE

## 2022-01-06 PROCEDURE — 90715 TDAP VACCINE 7 YRS/> IM: CPT | Performed by: FAMILY MEDICINE

## 2022-01-06 PROCEDURE — 82950 GLUCOSE TEST: CPT | Performed by: FAMILY MEDICINE

## 2022-01-06 PROCEDURE — 86780 TREPONEMA PALLIDUM: CPT | Performed by: FAMILY MEDICINE

## 2022-01-06 PROCEDURE — 99207 PR PRENATAL VISIT: CPT | Performed by: FAMILY MEDICINE

## 2022-01-06 NOTE — PROGRESS NOTES
Return OB visit  29-34 weeks    Subjective:   Esperanza is a 26 year old  female at 33w5d who returns for prenatal care. GAIL 2022.  - Concerns today: none  - Patient reports no vaginal bleeding, no leakage of fluid. Contractions are not present. Fetal movement is present.   - No nausea/vomiting. No heartburn.   - No vaginal discharge. No dysuria.   - No headache, vision changes, lower extremity swelling, upper abdominal pain, chest pain, shortness of breath.   - Mood has been good. They are moving to [place] tomorrow and are excited.    Objective:   /70   Pulse 96   Temp 97.9  F (36.6  C) (Oral)   Resp 16   Wt 84.1 kg (185 lb 6.4 oz)   LMP 05/15/2021   SpO2 100%   BMI 30.15 kg/m     Const: No distress  Abd: Gravid.   See flowsheet for FH, FHTs, fetal presentation, edema.    Prenatal labs:   -   Hemoglobin   Date Value Ref Range Status   10/18/2021 11.5 (L) 11.7 - 15.7 g/dL Final       Assessment & plan:   IUP at 33w5d weeks by LMP (off by 4d from 2nd trimester US)    - Pregnancy complicated by: late prenatal care  - Prenatal labs reviewed. 1 hour GCT done today. Fetal survey showed no abnormalities requiring follow up ultrasound.   - Patient is measuring appropriately for gestational age. Pregnancy weight gain has been 6lbs, but pt was not seen until 18wks.  - Patient plans to breast feed and is undecided on contraception after delivery, but does not want BTL.  - Provided counseling regarding:  labor signs, hospital readiness, parenting resources (WIC, etc).   - Patient will continue taking prenatal vitamins and avoiding cigarettes & alcohol. Tdap will be given today and flu shot has been given.       - Return to clinic in 2 weeks.     - Specific concerns addressed today:   1. 26 weeks gestation of pregnancy  - TdaP today  - Treponema Abs w Reflex to RPR and Titer  - Hemoglobin  - Glucose tolerance gest screen 1 hour    Options for treatment and follow-up care were reviewed with the  patient and/or guardian. Esperanza Wilder and/or guardian engaged in the decision making process and verbalized understanding of the options discussed and agreed with the final plan.    Arianne Morales, DO

## 2022-01-07 LAB — T PALLIDUM AB SER QL: NONREACTIVE

## 2022-01-20 ENCOUNTER — OFFICE VISIT (OUTPATIENT)
Dept: FAMILY MEDICINE | Facility: CLINIC | Age: 27
End: 2022-01-20
Payer: COMMERCIAL

## 2022-01-20 VITALS
RESPIRATION RATE: 16 BRPM | WEIGHT: 189 LBS | HEART RATE: 73 BPM | TEMPERATURE: 97.9 F | OXYGEN SATURATION: 100 % | BODY MASS INDEX: 30.74 KG/M2 | SYSTOLIC BLOOD PRESSURE: 95 MMHG | DIASTOLIC BLOOD PRESSURE: 61 MMHG

## 2022-01-20 DIAGNOSIS — Z3A.35 35 WEEKS GESTATION OF PREGNANCY: ICD-10-CM

## 2022-01-20 DIAGNOSIS — Z34.03 ENCOUNTER FOR SUPERVISION OF NORMAL FIRST PREGNANCY IN THIRD TRIMESTER: Primary | ICD-10-CM

## 2022-01-20 PROCEDURE — 59425 ANTEPARTUM CARE ONLY: CPT | Performed by: FAMILY MEDICINE

## 2022-01-20 NOTE — PROGRESS NOTES
Return OB visit  35-39 weeks    Subjective:   Esperanza is a 26 year old  female at 35w5d who returns for prenatal care. GAIL 2022.  - Concerns today: none  - Patient reports no vaginal bleeding, no leakage of fluid. No contractions. Fetal movement is present.   - No vaginal discharge. No dysuria.   - No headache, vision changes, lower extremity swelling, upper abdominal pain, chest pain, shortness of breath.   - Mood has been good. They just moved to [place], are planning to continue care through [facility].    Objective:   BP 95/61 (BP Location: Left arm, Patient Position: Sitting, Cuff Size: Adult Regular)   Pulse 73   Temp 97.9  F (36.6  C) (Oral)   Resp 16   Wt 85.7 kg (189 lb)   LMP 05/15/2021   SpO2 100%   Breastfeeding No   BMI 30.74 kg/m     Const: No distress  Abd: Gravid.   See flowsheet for FH, FHTs, fetal presentation, EFW, edema.     Prenatal labs:   Hemoglobin   Date Value Ref Range Status   2022 11.6 (L) 11.7 - 15.7 g/dL Final       Assessment & plan:   IUP at 35w5d weeks by LMP consistent with first trimester ultrasound.     - Pregnancy has been complicated by late entry to care  - Prenatal labs reviewed. Patient did pass 1 hour GCT. Fetal survey showed no abnormalities requiring follow up ultrasound.   - Patient is measuring appropriately for gestational age.  - Patient plans to breast feed  - Discussed  care & plan for vaccinations.   - Provided counseling regarding labor signs, hospital readiness (transportation, bags packed), pain control options during labor.   - Patient will continue taking prenatal vitamins and avoiding cigarettes & alcohol. Tdap has been given and flu shot have been given.     -Breastfeeding education provided: - Supporting a non-medicated birth  - Skin to Skin  - Non-separation of mother and baby    - Return to clinic in 1 week.    There are no diagnoses linked to this encounter.    Options for treatment and follow-up care were reviewed with the  patient and/or guardian. Esperanza Wilder and/or guardian engaged in the decision making process and verbalized understanding of the options discussed and agreed with the final plan.    Arianne Morales, DO

## 2022-01-25 ENCOUNTER — OFFICE VISIT (OUTPATIENT)
Dept: FAMILY MEDICINE | Facility: CLINIC | Age: 27
End: 2022-01-25
Attending: OBSTETRICS & GYNECOLOGY
Payer: COMMERCIAL

## 2022-01-25 ENCOUNTER — PRE VISIT (OUTPATIENT)
Dept: MATERNAL FETAL MEDICINE | Facility: CLINIC | Age: 27
End: 2022-01-25

## 2022-01-25 VITALS
RESPIRATION RATE: 16 BRPM | BODY MASS INDEX: 30.51 KG/M2 | DIASTOLIC BLOOD PRESSURE: 64 MMHG | WEIGHT: 187.6 LBS | OXYGEN SATURATION: 98 % | HEART RATE: 74 BPM | SYSTOLIC BLOOD PRESSURE: 94 MMHG | TEMPERATURE: 98.1 F

## 2022-01-25 DIAGNOSIS — R30.0 DYSURIA: ICD-10-CM

## 2022-01-25 DIAGNOSIS — Z3A.36 36 WEEKS GESTATION OF PREGNANCY: Primary | ICD-10-CM

## 2022-01-25 DIAGNOSIS — Z03.74 FETAL GROWTH PROBLEM SUSPECTED BUT NOT FOUND: ICD-10-CM

## 2022-01-25 LAB
ALBUMIN UR-MCNC: NEGATIVE MG/DL
APPEARANCE UR: CLEAR
BILIRUB UR QL STRIP: NEGATIVE
COLOR UR AUTO: YELLOW
GLUCOSE UR STRIP-MCNC: NEGATIVE MG/DL
HGB BLD-MCNC: 11.7 G/DL (ref 11.7–15.7)
HGB UR QL STRIP: NEGATIVE
KETONES UR STRIP-MCNC: NEGATIVE MG/DL
LEUKOCYTE ESTERASE UR QL STRIP: NEGATIVE
NITRATE UR QL: NEGATIVE
PH UR STRIP: 6 [PH] (ref 5–8)
SP GR UR STRIP: 1.02 (ref 1–1.03)
UROBILINOGEN UR STRIP-ACNC: 1 E.U./DL

## 2022-01-25 PROCEDURE — 99214 OFFICE O/P EST MOD 30 MIN: CPT | Mod: GC | Performed by: STUDENT IN AN ORGANIZED HEALTH CARE EDUCATION/TRAINING PROGRAM

## 2022-01-25 PROCEDURE — 36415 COLL VENOUS BLD VENIPUNCTURE: CPT | Performed by: STUDENT IN AN ORGANIZED HEALTH CARE EDUCATION/TRAINING PROGRAM

## 2022-01-25 PROCEDURE — 85018 HEMOGLOBIN: CPT | Performed by: STUDENT IN AN ORGANIZED HEALTH CARE EDUCATION/TRAINING PROGRAM

## 2022-01-25 PROCEDURE — 81003 URINALYSIS AUTO W/O SCOPE: CPT | Performed by: STUDENT IN AN ORGANIZED HEALTH CARE EDUCATION/TRAINING PROGRAM

## 2022-01-25 PROCEDURE — 87653 STREP B DNA AMP PROBE: CPT | Performed by: STUDENT IN AN ORGANIZED HEALTH CARE EDUCATION/TRAINING PROGRAM

## 2022-01-25 RX ORDER — FERROUS SULFATE 325(65) MG
325 TABLET ORAL
Qty: 30 TABLET | Refills: 0 | Status: SHIPPED | OUTPATIENT
Start: 2022-01-25

## 2022-01-25 NOTE — PROGRESS NOTES
Preceptor Attestation:   Patient seen, evaluated and discussed with the resident. I have verified the content of the note, which accurately reflects my assessment of the patient and the plan of care.   Supervising Physician:  Diaz Iraheta MD

## 2022-01-25 NOTE — PROGRESS NOTES
Subjective   Esperanza is a 26 year old who presents for prenatal care.     HPI   Esperanza is a 26 year old  female at 36w3d who returns for prenatal care. GAIL 2022.  - Concerns today: No concerns today.  - Patient reports no vaginal bleeding, no leakage of fluid. Denies any abdominal cramping or contractions. Fetal movement is present, feels baby at least 1x an hour.   - Novaginal discharge. Reports some dysuria, no foul odor, no hematuria or color change to her urine.    - No headache, vision changes, upper abdominal pain, chest pain, shortness of breath. Some trace lower leg swelling, resolves with elevation of legs.   - Mood has been good.    Patient plans to deliver at [facility] and will breast feed.      Review of Systems   Constitutional, HEENT, cardiovascular, pulmonary, gi and gu systems are negative, except as otherwise noted.      Objective    BP 94/64   Pulse 74   Temp 98.1  F (36.7  C) (Oral)   Resp 16   Wt 85.1 kg (187 lb 9.6 oz)   LMP 05/15/2021   SpO2 98%   BMI 30.51 kg/m    Body mass index is 30.51 kg/m .  Physical Exam     Const: No distress  RESP: CTAB, breathing comfortably on room air.  CARDS: RRR   Abd: Gravid.  MSK: Mild edema to bilateral lower extremities  See flowsheet for FH, FHTs, fetal presentation, EFW, edema.     Assessment & plan:   IUP at 36w3d weeks by LMP consistent with first trimester ultrasound.      - Pregnancy complicated by: late and inconsistent prenatal care  - Prenatal labs reviewed. Patient did pass 1 hour GCT performed on 2022. Fetal survey showed no abnormalities requiring follow up ultrasound.   - GBS obtained today. 3rd trimester Hb obtained today.     - Patient is measuring small for gestational age .Pregnancy weight gain has been 8.5lbs as of , but pt was not seen until 18wks. Patient was not weighed today. Fundal height measuring 33cm.   Recommending MFM follow up with ultrasound to assess fetal size, concern for IUGR versus physiologic  variance in size.     -Patient complains of intermittent dysuria. Will collect a UA today to evaluate for UTI.     - Patient plans to breast feed, undecided on contraception after birth.  - Provided counseling regarding labor signs, hospital readiness (transportation, bags packed), pain control options during labor.   - Patient will continue taking prenatal vitamins and avoiding cigarettes & alcohol. Tdap and flu shot have been given. Patient given iron supplement given borderline low hemoglobin at 11.6. Will redraw today.      -Breastfeeding education provided: - Supporting a non-medicated birth  - Skin to Skin  - Non-separation of mother and baby     - Return to clinic in 1 week.     - Specific concerns addressed today:   There are no diagnoses linked to this encounter.        Options for treatment and follow-up care were reviewed with the patient and/or guardian. Esperanza Wilder and/or guardian engaged in the decision making process and verbalized understanding of the options discussed and agreed with the final plan.    Kimmie Dias MS3      Resident/Fellow Attestation   I, Dimitri Garcia, was present with the medical/JEANNETTE student who participated in the service and in the documentation of the note.  I have verified the history and personally performed the physical exam and medical decision making.  I agree with the assessment and plan of care as documented in the note.      Dimitri Garcia MD  PGY2

## 2022-01-25 NOTE — PATIENT INSTRUCTIONS
Thank you for coming to [facility]!  - If you had lab testing today and your results are normal they will be be mailed to you or sent to your MyChart within seven days.   - If the lab tests need quick action we will call you with the results.  - The phone number we will call with results is # [phone] (home) . If this is not the best number please call our clinic and change the number.  - If any referrals were made to [facility] and you don't get a call, call central scheduling [phone]  - If you need any refills please call your pharmacy and they will contact us.  - If you had an XRay/CT/Ultrasound/MRI ordered the number is [phone] to schedule or change your appointment  - If you have any concerns about today's visit or wish to schedule another appointment please call our office [phone] (8-5:00 M-F)  - If you have urgent medical concerns call [phone] at any time of the day.  - If you have a medical emergency please call [phone].    Because you are pregnant, we have additional resources for you:  - You may call and ask to speak with one of our clinic nurses at [phone] during normal business hours, for non-urgent questions about your pregnancy.  - Immediate OB help is also available 24 hours a day, seven days a week via the [facility] ([facility]) - [phone]  located at [address] [place] [address]    Prenatal Timeline:  Before 14 weeks: dating ultrasound       This ultrasound helps us determine your dates accurately.  15 - 18 weeks: Optional genetic testing (quad screen)       This testing helps understand your baby's risk for some genetic abnormalities.  22 - 24 weeks: Ultrasound (fetal anatomy survey)       This testing will look for early growth abnormalities, and might tell the baby's sex.  24 - 28 weeks: One hour sugar test (GCT)        This test helps identify diabetes of pregnancy.  27 - 36 weeks: Tetanus  shot (Tdap)       This shot helps protect you and your baby from tetanus and whooping cough.  36 weeks and later: Group B Strep test (GBS)       This test helps predict if you need antibiotics in labor to prevent infection in your baby.  Anytime September to April: flu shot       This shot helps protect you and your family from the flu.  This is especially important during pregnancy!  Once every trimester: blood iron test (hemoglobin)       This test helps us know if you will need extra iron to support your baby.  At any time during or after your pregnancy: Some women experience baby blues.  We can help you with: Feeling anxious, Overwhelmed or sad, Trouble sleeping, Crying uncontrollably, Trouble caring for yourself or baby.    How frequently should you see your provider?  < 28 weeks: every 4 weeks  28-36 weeks: every 2 weeks  >36 weeks: every week    Call your healthcare provider immediately if you experience any of the following symptoms:  Bleeding from nipples, rectum, bladder, or coughing up blood  Vaginal bleeding, no matter how slight (unless small amount after a pelvic exam)  Swelling of hands or face  Dimness or blurring of vision  Severe or continuous headaches  Abdominal pains or contractions that do not go away with heat and rest or a bowel movement  Chills or fever over 100.4  Persistent vomiting  Painful or burning urination  Decrease in fetal movement  Sudden or slow escape of fluid from the vagina

## 2022-01-26 ENCOUNTER — OFFICE VISIT (OUTPATIENT)
Dept: MATERNAL FETAL MEDICINE | Facility: CLINIC | Age: 27
End: 2022-01-26
Attending: FAMILY MEDICINE
Payer: COMMERCIAL

## 2022-01-26 ENCOUNTER — HOSPITAL ENCOUNTER (OUTPATIENT)
Dept: ULTRASOUND IMAGING | Facility: CLINIC | Age: 27
End: 2022-01-26
Attending: FAMILY MEDICINE
Payer: COMMERCIAL

## 2022-01-26 DIAGNOSIS — O26.13 POOR WEIGHT GAIN OF PREGNANCY, THIRD TRIMESTER: Primary | ICD-10-CM

## 2022-01-26 DIAGNOSIS — O26.90 PREGNANCY RELATED CONDITION, ANTEPARTUM: ICD-10-CM

## 2022-01-26 LAB
GP B STREP DNA SPEC QL NAA+PROBE: NEGATIVE
PATIENT PENICILLIN, AMOXICILLIN, CEPHALOSPORINS ALLERGY: NO

## 2022-01-26 PROCEDURE — 76811 OB US DETAILED SNGL FETUS: CPT | Mod: 26 | Performed by: OBSTETRICS & GYNECOLOGY

## 2022-01-26 PROCEDURE — 76811 OB US DETAILED SNGL FETUS: CPT

## 2022-01-26 NOTE — PROGRESS NOTES
"Please see \"Imaging\" tab under \"Chart Review\" for details of today's US at the [facility].    Rishi Bhatia MD  Maternal-Fetal Medicine      "

## 2022-02-08 ENCOUNTER — OFFICE VISIT (OUTPATIENT)
Dept: FAMILY MEDICINE | Facility: CLINIC | Age: 27
End: 2022-02-08
Payer: COMMERCIAL

## 2022-02-08 VITALS
WEIGHT: 189.8 LBS | BODY MASS INDEX: 30.5 KG/M2 | RESPIRATION RATE: 16 BRPM | HEART RATE: 80 BPM | TEMPERATURE: 98.1 F | OXYGEN SATURATION: 99 % | HEIGHT: 66 IN | DIASTOLIC BLOOD PRESSURE: 70 MMHG | SYSTOLIC BLOOD PRESSURE: 105 MMHG

## 2022-02-08 DIAGNOSIS — Z34.03 ENCOUNTER FOR SUPERVISION OF NORMAL FIRST PREGNANCY IN THIRD TRIMESTER: Primary | ICD-10-CM

## 2022-02-08 DIAGNOSIS — Z3A.38 38 WEEKS GESTATION OF PREGNANCY: ICD-10-CM

## 2022-02-08 PROCEDURE — 99213 OFFICE O/P EST LOW 20 MIN: CPT | Performed by: FAMILY MEDICINE

## 2022-02-08 ASSESSMENT — MIFFLIN-ST. JEOR: SCORE: 1618.68

## 2022-02-08 NOTE — PATIENT INSTRUCTIONS
[facility] [phone]    [facility] [phone]    [facility] [phone]

## 2022-02-14 NOTE — PROGRESS NOTES
"Return OB visit  35-39 weeks    Subjective:   Esperanza is a 26 year old  female at 38w3d who returns for prenatal care. GAIL 2022.  - Concerns today: wanted to transfer care to a provider that delivers at [facility] (near where she and her partner recently moved) but has had trouble scheduling an appointment  - Patient reports no vaginal bleeding, no leakage of fluid. Contractions none. Fetal movement is present.   - No vaginal discharge. No dysuria.   - No headache, vision changes, lower extremity swelling, upper abdominal pain, chest pain, shortness of breath.   - Mood has been good.    Objective:   /70   Pulse 80   Temp 98.1  F (36.7  C) (Oral)   Resp 16   Ht 1.67 m (5' 5.75\")   Wt 86.1 kg (189 lb 12.8 oz)   LMP 05/15/2021   SpO2 99%   BMI 30.87 kg/m     Const: No distress  Abd: Gravid.   See flowsheet for FH, FHTs, fetal presentation, EFW, edema.     Prenatal labs:   Hemoglobin   Date Value Ref Range Status   2022 11.7 11.7 - 15.7 g/dL Final       Assessment & plan:   IUP at 38w3d weeks by LMP, not consistent with second trimester ultrasound (39w).     - Pregnancy complicated by: late entry to care  - Prenatal labs reviewed. Patient did pass 1 hour GCT. Fetal survey showed no abnormalities requiring follow up ultrasound (did show hypoplastic nasal bone).   - GBS obtained today. 3rd trimester Hb obtained today.   - Patient is measuring appropriately for gestational age. Pregnancy weight gain has been 9lbs since 19.5wga.  - Patient plans to breast feed.  - Discussed  care & plan for vaccinations.   - Provided counseling regarding labor signs, hospital readiness (transportation, bags packed), pain control options during labor.   - Patient will continue taking prenatal vitamins and avoiding cigarettes & alcohol. Tdap and flu shot have been given.     Patient would like to delivery at [facility] and has been trying to schedule an appointment with a provider there. Ob/gyne " coordinator assisted in scheduling her and we will transfer care to [facility] at this time per patient preference.    Options for treatment and follow-up care were reviewed with the patient and/or guardian. Esperanza Wilder and/or guardian engaged in the decision making process and verbalized understanding of the options discussed and agreed with the final plan.    Arianne Morales, DO

## 2022-10-03 ENCOUNTER — HEALTH MAINTENANCE LETTER (OUTPATIENT)
Age: 27
End: 2022-10-03

## 2023-02-11 ENCOUNTER — HEALTH MAINTENANCE LETTER (OUTPATIENT)
Age: 28
End: 2023-02-11

## 2024-03-09 ENCOUNTER — HEALTH MAINTENANCE LETTER (OUTPATIENT)
Age: 29
End: 2024-03-09

## 2024-09-11 ENCOUNTER — OFFICE VISIT (OUTPATIENT)
Dept: ORTHOPEDICS | Facility: CLINIC | Age: 29
End: 2024-09-11
Payer: COMMERCIAL

## 2024-09-11 ENCOUNTER — ANCILLARY PROCEDURE (OUTPATIENT)
Dept: GENERAL RADIOLOGY | Facility: CLINIC | Age: 29
End: 2024-09-11
Attending: FAMILY MEDICINE
Payer: COMMERCIAL

## 2024-09-11 VITALS — HEIGHT: 66 IN | BODY MASS INDEX: 37.28 KG/M2 | WEIGHT: 232 LBS

## 2024-09-11 DIAGNOSIS — M62.838 CERVICAL PARASPINOUS MUSCLE SPASM: ICD-10-CM

## 2024-09-11 DIAGNOSIS — M54.2 CERVICALGIA: Primary | ICD-10-CM

## 2024-09-11 DIAGNOSIS — M54.2 CERVICALGIA: ICD-10-CM

## 2024-09-11 PROCEDURE — 99204 OFFICE O/P NEW MOD 45 MIN: CPT | Performed by: FAMILY MEDICINE

## 2024-09-11 PROCEDURE — 72040 X-RAY EXAM NECK SPINE 2-3 VW: CPT | Mod: TC | Performed by: STUDENT IN AN ORGANIZED HEALTH CARE EDUCATION/TRAINING PROGRAM

## 2024-09-11 RX ORDER — CYCLOBENZAPRINE HCL 10 MG
10 TABLET ORAL 3 TIMES DAILY PRN
Qty: 30 TABLET | Refills: 0 | Status: SHIPPED | OUTPATIENT
Start: 2024-09-11

## 2024-09-11 NOTE — PROGRESS NOTES
"Esperanza Wilder  :  1995  DOS: 2024  MRN: [id number]    Sports Medicine Clinic Visit    PCP: No Ref-Primary, Physician    Esperanza Wilder is a 29 year old Right hand dominant female who is seen as a self referral, as a WALK IN patient presenting with acute c-spine pain.    Injury: Reports insidious onset without acute precipitating event that patient first noticed earlier today.  Pain located over left c-spine with radiation to left occipital region, nonradiating.  Additional Features:  Positive: muscle tension.  Symptoms are better with hold neck still.  Symptoms are worse with: cervical flexion, cervical rotation, driving.  Other evaluation and/or treatments so far consists of: Ice and Rest.  Recent imaging completed: No recent imaging completed.  Prior History of related problems: history of similar pain that usually resolves after 1 - 2 hours    Social History: currently employed as medical assembly labor    Review of Systems  Musculoskeletal: as above  Remainder of review of systems is negative including constitutional, CV, pulmonary, GI, Skin and Neurologic except as noted in HPI or medical history.    No past medical history on file.  No past surgical history on file.  No family history on file.      Objective  Ht 1.683 m (5' 6.25\")   Wt 105.2 kg (232 lb)   BMI 37.16 kg/m      General: healthy, alert and in no distress    HEENT: no scleral icterus or conjunctival erythema   Skin: no suspicious lesions or rash. No jaundice.   CV: regular rhythm by palpation, 2+ distal pulses, no pedal edema    Resp: normal respiratory effort without conversational dyspnea   Psych: normal mood and affect    Gait: nonantalgic, appropriate coordination and balance   Neuro: normal light touch sensory exam of the extremities. Motor strength as noted below     Cervical Spine Exam    Range of Motion:         decreased active and passive ROM forward flexion, extension, lateral rotation, lateral flexion due to pain along the " left sided neck and upper shoulder    Inspection:         No visible deformity        decreased lordotic curvature due to spasm    Tender:        paraspinal muscles left mid-cervical       upper border of trapezius       Left SCM     Non-Tender:        remainder of cervical spine area       midline    Strength:       C4 (shoulder shrug)  symmetric 5/5       C5 (shoulder abduction) symmetric 5/5       C6 (elbow flexion) symmetric 5/5       C7 (elbow extension) symmetric 5/5       C8 (finger abduction, thumb flexion) symmetric 5/5    Reflexes:        C5 (biceps) symmetric normal       C6 (supinator) symmetric normal       C7 (triceps) symmetric normal    Sensation:       grossly intact througout bilateral upper extremities    Special Tests:       neg (-) Spurling            neg (-) Adson's    Skin:       well perfused       capillary refill less than 2 seconds    Lymphatics:        no edema noted in the upper extremities     Radiology  Recent Results (from the past 744 hour(s))   XR Cervical Spine 2/3 vws    Narrative    EXAM: XR CERVICAL SPINE 2/3 VIEWS  LOCATION: [facility]  DATE: 9/11/2024    INDICATION:  Cervicalgia    COMPARISON: None.      Impression    IMPRESSION: No fracture. Normal vertebral heights and alignment. Normal disc spaces and facets for age. Normal extraspinal structures.             Assessment:  1. Cervicalgia    2. Cervical paraspinous muscle spasm        Plan:  Discussed the assessment with the patient.  Follow up: 1-2 weeks prn based on progress  Has had minor episodes that resolved quickly in the past, a minute or two  This episode only a few hours old but more painful and debilitting  Reviewed risk factors including ergonomics for work, home duties and breastfeeding  Reviewed gentle stretching  Use of short 1-2 wk course of NSAIDs reviewed, dosing and precautions reviewed if utilized  Muscle relaxer provided for pain control and sleep  PT ordered and appt  facilitated for tomorrow  XR images independently visualized and reviewed with patient today in clinic  No concerning pathology noted, mild strengthening of the normal lordosis likely secondary to muscle spasm  TPI could be considered in the future, gentle massage encouraged  We discussed modified progressive pain-free activity as tolerated  Home handouts provided and supportive care reviewed  All questions were answered today  Contact us with additional questions or concerns  Signs and sx of concern reviewed      Tejas Shabazz DO, MARIAN  Sports Medicine Physician  [facility]            Disclaimer: This note consists of symbols derived from keyboarding, dictation and/or voice recognition software. As a result, there may be errors in the script that have gone undetected. Please consider this when interpreting information found in this chart.

## 2024-09-11 NOTE — LETTER
"2024      Esperanza Wilder  [address]  [address]  [facility] [address]      Dear Colleague,    Thank you for referring your patient, Esperanza Wilder, to the [facility]. Please see a copy of my visit note below.    Esperanza Wilder  :  1995  DOS: 2024  MRN: [id number]    Sports Medicine Clinic Visit    PCP: No Ref-Primary, Physician    Esperanza Wilder is a 29 year old Right hand dominant female who is seen as a self referral, as a WALK IN patient presenting with acute c-spine pain.    Injury: Reports insidious onset without acute precipitating event that patient first noticed earlier today.  Pain located over left c-spine with radiation to left occipital region, nonradiating.  Additional Features:  Positive: muscle tension.  Symptoms are better with hold neck still.  Symptoms are worse with: cervical flexion, cervical rotation, driving.  Other evaluation and/or treatments so far consists of: Ice and Rest.  Recent imaging completed: No recent imaging completed.  Prior History of related problems: history of similar pain that usually resolves after 1 - 2 hours    Social History: currently employed as medical assembly labor    Review of Systems  Musculoskeletal: as above  Remainder of review of systems is negative including constitutional, CV, pulmonary, GI, Skin and Neurologic except as noted in HPI or medical history.    No past medical history on file.  No past surgical history on file.  No family history on file.      Objective  Ht 1.683 m (5' 6.25\")   Wt 105.2 kg (232 lb)   BMI 37.16 kg/m      General: healthy, alert and in no distress    HEENT: no scleral icterus or conjunctival erythema   Skin: no suspicious lesions or rash. No jaundice.   CV: regular rhythm by palpation, 2+ distal pulses, no pedal edema    Resp: normal respiratory effort without conversational dyspnea   Psych: normal mood and affect    Gait: nonantalgic, appropriate coordination and balance   Neuro: " normal light touch sensory exam of the extremities. Motor strength as noted below     Cervical Spine Exam    Range of Motion:         decreased active and passive ROM forward flexion, extension, lateral rotation, lateral flexion due to pain along the left sided neck and upper shoulder    Inspection:         No visible deformity        decreased lordotic curvature due to spasm    Tender:        paraspinal muscles left mid-cervical       upper border of trapezius       Left SCM     Non-Tender:        remainder of cervical spine area       midline    Strength:       C4 (shoulder shrug)  symmetric 5/5       C5 (shoulder abduction) symmetric 5/5       C6 (elbow flexion) symmetric 5/5       C7 (elbow extension) symmetric 5/5       C8 (finger abduction, thumb flexion) symmetric 5/5    Reflexes:        C5 (biceps) symmetric normal       C6 (supinator) symmetric normal       C7 (triceps) symmetric normal    Sensation:       grossly intact througout bilateral upper extremities    Special Tests:       neg (-) Spurling            neg (-) Adson's    Skin:       well perfused       capillary refill less than 2 seconds    Lymphatics:        no edema noted in the upper extremities     Radiology  Recent Results (from the past 744 hour(s))   XR Cervical Spine 2/3 vws    Narrative    EXAM: XR CERVICAL SPINE 2/3 VIEWS  LOCATION: [facility]  DATE: 9/11/2024    INDICATION:  Cervicalgia    COMPARISON: None.      Impression    IMPRESSION: No fracture. Normal vertebral heights and alignment. Normal disc spaces and facets for age. Normal extraspinal structures.             Assessment:  1. Cervicalgia    2. Cervical paraspinous muscle spasm        Plan:  Discussed the assessment with the patient.  Follow up: 1-2 weeks prn based on progress  Has had minor episodes that resolved quickly in the past, a minute or two  This episode only a few hours old but more painful and debilitting  Reviewed risk factors  including ergonomics for work, home duties and breastfeeding  Reviewed gentle stretching  Use of short 1-2 wk course of NSAIDs reviewed, dosing and precautions reviewed if utilized  Muscle relaxer provided for pain control and sleep  PT ordered and appt facilitated for tomorrow  XR images independently visualized and reviewed with patient today in clinic  No concerning pathology noted, mild strengthening of the normal lordosis likely secondary to muscle spasm  TPI could be considered in the future, gentle massage encouraged  We discussed modified progressive pain-free activity as tolerated  Home handouts provided and supportive care reviewed  All questions were answered today  Contact us with additional questions or concerns  Signs and sx of concern reviewed      Tejas Shabazz DO, CAQ  Sports Medicine Physician  [facility]            Disclaimer: This note consists of symbols derived from keyboarding, dictation and/or voice recognition software. As a result, there may be errors in the script that have gone undetected. Please consider this when interpreting information found in this chart.      Again, thank you for allowing me to participate in the care of your patient.        Sincerely,        Tejas Shabazz DO

## 2024-09-11 NOTE — LETTER
September 11, 2024      Esperanza was seen in my office today for an acute injury.  She should be medically excused from work until Monday September 16, 2024 if needed.  If she is feeling better before that time she can return to work sooner as tolerated.      Tejas Guerrero DO, CAQ  Sports Medicine Physician  [facility]

## 2024-09-12 ENCOUNTER — THERAPY VISIT (OUTPATIENT)
Dept: PHYSICAL THERAPY | Facility: CLINIC | Age: 29
End: 2024-09-12
Attending: FAMILY MEDICINE
Payer: COMMERCIAL

## 2024-09-12 DIAGNOSIS — M62.838 CERVICAL PARASPINOUS MUSCLE SPASM: ICD-10-CM

## 2024-09-12 DIAGNOSIS — M54.2 CERVICALGIA: ICD-10-CM

## 2024-09-12 PROCEDURE — 97161 PT EVAL LOW COMPLEX 20 MIN: CPT | Mod: GP

## 2024-09-12 PROCEDURE — 97110 THERAPEUTIC EXERCISES: CPT | Mod: GP

## 2024-09-12 PROCEDURE — 97140 MANUAL THERAPY 1/> REGIONS: CPT | Mod: GP

## 2024-09-12 NOTE — PROGRESS NOTES
PHYSICAL THERAPY EVALUATION  Type of Visit: Evaluation       Fall Risk Screen:  Fall screen completed by: PT  Have you fallen 2 or more times in the past year?: No  Have you fallen and had an injury in the past year?: No  Is patient a fall risk?: No    Subjective       Presenting condition or subjective complaint:      Pt woke up yesterday feeling fine, then at about 2-3pm with neck discomfort. Pain is L side from ear to shoulder, along the upper trap.     Never happened before.     Worse: turning head to L, extension, flexion, sidebending as well    No radiation, no dropping items, no weakness that she notes.     Date of onset: 24    Relevant medical history:     Dates & types of surgery:      Prior diagnostic imaging/testing results:       Prior therapy history for the same diagnosis, illness or injury:        Pain assessment: Pain present  Location: neck/Ratin/10     Objective   CERVICAL SPINE EVALUATION  POSTURE:  pt displaying trunk rotation to L vs cervical rotation    ROM:   (Degrees) Left AROM Right AROM    Cervical Flexion Chin to chest, very tight    Cervical Extension Mod loss, pain on L    Cervical Side bend Max loss, pain Mod loss, pain    Cervical Rotation Max loss, sharp pain Min loss, pain on L    Cervical Protrusion     Cervical Retraction Max loss, pain       MYOTOMES: WNL  DTR S: WNL  CORD SIGNS: WNL  DERMATOMES: WNL  NEURAL TENSION:  (-) upper limb tension tests  FLEXIBILITY:  decreased L upper trap, decreased L scalene    SPECIAL TESTS:  (+) distraction test, (-) spurlings  PALPATION:  tenderness L scalenes, SCM, upper trap, levator    Assessment & Plan   CLINICAL IMPRESSIONS  Medical Diagnosis: neck pain    Treatment Diagnosis: neck pain   Impression/Assessment: Patient is a 29 year old female with neck complaints.  The following significant findings have been identified: Pain, Decreased ROM/flexibility, Decreased joint mobility, Decreased strength, and Decreased activity tolerance.  These impairments interfere with their ability to perform self care tasks, work tasks, recreational activities, household chores, household mobility, and community mobility as compared to previous level of function.     Clinical Decision Making (Complexity):  Clinical Presentation: Stable/Uncomplicated  Clinical Presentation Rationale: based on medical and personal factors listed in PT evaluation  Clinical Decision Making (Complexity): Low complexity    PLAN OF CARE  Treatment Interventions:  Interventions: Manual Therapy, Neuromuscular Re-education, Therapeutic Activity, Therapeutic Exercise    Long Term Goals     PT Goal 1  Goal Identifier: neck AROM  Goal Description: pt will present with full neck AROM in all directions with no neck pain or discomfort  Rationale: to maximize safety and independence with performance of ADLs and functional tasks;to maximize safety and independence within the home;to maximize safety and independence within the community  Target Date: 11/07/24      Frequency of Treatment: 1x/week  Duration of Treatment: 10 weeks    Recommended Referrals to Other Professionals:   Education Assessment:   Learner/Method: Patient;No Barriers to Learning  Education Comments: educated on diagnosis, prognosis, expectations of therapy, and importance of movement    Risks and benefits of evaluation/treatment have been explained.   Patient/Family/caregiver agrees with Plan of Care.     Evaluation Time:     PT Eval, Low Complexity Minutes (79924): 15     Signing Clinician: RIDDHI LORA, PT        [facility]                                                                                   OUTPATIENT PHYSICAL THERAPY      PLAN OF TREATMENT FOR OUTPATIENT REHABILITATION   Patient's Last Name, First Name, Esperanza Moy    YOB: 1995   Provider's Name   [facility]   Medical Record No.  [id number]     Onset Date: 09/11/24  Start of  Care Date: 09/12/24     Medical Diagnosis:  neck pain      PT Treatment Diagnosis:  neck pain Plan of Treatment  Frequency/Duration: 1x/week/ 10 weeks    Certification date from 09/12/24 to 11/21/24         See note for plan of treatment details and functional goals     RIDDHI LORA, PT                         I CERTIFY THE NEED FOR THESE SERVICES FURNISHED UNDER        THIS PLAN OF TREATMENT AND WHILE UNDER MY CARE     (Physician attestation of this document indicates review and certification of the therapy plan).              Referring Provider:  Tejas Shabazz    Initial Assessment  See Epic Evaluation- Start of Care Date: 09/12/24

## 2025-03-16 ENCOUNTER — HEALTH MAINTENANCE LETTER (OUTPATIENT)
Age: 30
End: 2025-03-16